# Patient Record
Sex: MALE | Race: WHITE | NOT HISPANIC OR LATINO | URBAN - METROPOLITAN AREA
[De-identification: names, ages, dates, MRNs, and addresses within clinical notes are randomized per-mention and may not be internally consistent; named-entity substitution may affect disease eponyms.]

---

## 2018-06-29 ENCOUNTER — OFFICE VISIT - HEALTHEAST (OUTPATIENT)
Dept: CARDIOLOGY | Facility: CLINIC | Age: 28
End: 2018-06-29

## 2018-06-29 DIAGNOSIS — I48.91 A-FIB (H): ICD-10-CM

## 2018-06-29 DIAGNOSIS — R00.2 PALPITATIONS: ICD-10-CM

## 2018-06-29 DIAGNOSIS — R07.9 ACUTE CHEST PAIN: ICD-10-CM

## 2018-06-29 ASSESSMENT — MIFFLIN-ST. JEOR: SCORE: 2252.66

## 2018-07-03 LAB
ATRIAL RATE - MUSE: 64 BPM
DIASTOLIC BLOOD PRESSURE - MUSE: NORMAL MMHG
INTERPRETATION ECG - MUSE: NORMAL
P AXIS - MUSE: 55 DEGREES
PR INTERVAL - MUSE: 158 MS
QRS DURATION - MUSE: 86 MS
QT - MUSE: 396 MS
QTC - MUSE: 408 MS
R AXIS - MUSE: 51 DEGREES
SYSTOLIC BLOOD PRESSURE - MUSE: NORMAL MMHG
T AXIS - MUSE: -2 DEGREES
VENTRICULAR RATE- MUSE: 64 BPM

## 2018-07-25 ENCOUNTER — HOSPITAL ENCOUNTER (OUTPATIENT)
Dept: CARDIOLOGY | Facility: HOSPITAL | Age: 28
Discharge: HOME | End: 2018-07-25
Attending: INTERNAL MEDICINE

## 2018-07-25 ENCOUNTER — COMMUNICATION - HEALTHEAST (OUTPATIENT)
Dept: CARDIOLOGY | Facility: CLINIC | Age: 28
End: 2018-07-25

## 2018-07-25 DIAGNOSIS — R00.2 PALPITATIONS: ICD-10-CM

## 2018-07-25 DIAGNOSIS — I48.91 A-FIB (H): ICD-10-CM

## 2018-07-25 DIAGNOSIS — R07.9 ACUTE CHEST PAIN: ICD-10-CM

## 2018-07-25 DIAGNOSIS — I47.10 SVT (SUPRAVENTRICULAR TACHYCARDIA) (H): ICD-10-CM

## 2018-07-25 LAB
AORTIC ROOT: 3.4 CM
BSA FOR ECHO PROCEDURE: 2.52 M2
CV ECHO HEIGHT: 75 IN
CV ECHO WEIGHT: 265 LBS
CV STRESS CURRENT BP HE: NORMAL
CV STRESS CURRENT HR HE: 100
CV STRESS CURRENT HR HE: 104
CV STRESS CURRENT HR HE: 105
CV STRESS CURRENT HR HE: 116
CV STRESS CURRENT HR HE: 119
CV STRESS CURRENT HR HE: 120
CV STRESS CURRENT HR HE: 123
CV STRESS CURRENT HR HE: 123
CV STRESS CURRENT HR HE: 130
CV STRESS CURRENT HR HE: 132
CV STRESS CURRENT HR HE: 134
CV STRESS CURRENT HR HE: 135
CV STRESS CURRENT HR HE: 140
CV STRESS CURRENT HR HE: 142
CV STRESS CURRENT HR HE: 152
CV STRESS CURRENT HR HE: 155
CV STRESS CURRENT HR HE: 160
CV STRESS CURRENT HR HE: 161
CV STRESS CURRENT HR HE: 165
CV STRESS CURRENT HR HE: 165
CV STRESS CURRENT HR HE: 166
CV STRESS CURRENT HR HE: 173
CV STRESS CURRENT HR HE: 174
CV STRESS CURRENT HR HE: 176
CV STRESS CURRENT HR HE: 181
CV STRESS CURRENT HR HE: 184
CV STRESS CURRENT HR HE: 184
CV STRESS CURRENT HR HE: 69
CV STRESS CURRENT HR HE: 86
CV STRESS CURRENT HR HE: 97
CV STRESS DEVIATION TIME HE: NORMAL
CV STRESS ECHO PERCENT HR HE: NORMAL
CV STRESS EXERCISE STAGE HE: NORMAL
CV STRESS EXERCISE STAGE REACHED HE: NORMAL
CV STRESS FINAL RESTING BP HE: NORMAL
CV STRESS FINAL RESTING HR HE: 119
CV STRESS MAX HR HE: 185
CV STRESS MAX TREADMILL GRADE HE: 16
CV STRESS MAX TREADMILL SPEED HE: 4.2
CV STRESS PEAK DIA BP HE: NORMAL
CV STRESS PEAK SYS BP HE: NORMAL
CV STRESS PHASE HE: NORMAL
CV STRESS PROTOCOL HE: NORMAL
CV STRESS RESTING PT POSITION HE: NORMAL
CV STRESS RESTING PT POSITION HE: NORMAL
CV STRESS ST DEVIATION AMOUNT HE: NORMAL
CV STRESS ST DEVIATION ELEVATION HE: NORMAL
CV STRESS ST EVELATION AMOUNT HE: NORMAL
CV STRESS TEST TYPE HE: NORMAL
CV STRESS TOTAL STAGE TIME MIN 1 HE: NORMAL
DOP CALC LVOT AREA: 4.52 CM2
DOP CALC LVOT DIAMETER: 2.4 CM
DOP CALC LVOT STROKE VOLUME: 85 CM3
DOP CALCLVOT PEAK VEL VTI: 18.8 CM
EJECTION FRACTION: 73 % (ref 55–75)
FRACTIONAL SHORTENING: 45.6 % (ref 28–44)
INTERVENTRICULAR SEPTUM IN END DIASTOLE: 0.7 CM (ref 0.6–1)
IVS/PW RATIO: 0.9
LA AREA 1: 15 CM2
LA AREA 2: 14.6 CM2
LEFT ATRIUM LENGTH: 4.52 CM
LEFT ATRIUM SIZE: 4.4 CM
LEFT ATRIUM TO AORTIC ROOT RATIO: 1.29 NO UNITS
LEFT ATRIUM VOLUME INDEX: 16.3 ML/M2
LEFT ATRIUM VOLUME: 41.2 ML
LEFT VENTRICLE CARDIAC INDEX: 1.7 L/MIN/M2
LEFT VENTRICLE CARDIAC OUTPUT: 4.2 L/MIN
LEFT VENTRICLE DIASTOLIC VOLUME INDEX: 53.2 CM3/M2 (ref 34–74)
LEFT VENTRICLE DIASTOLIC VOLUME: 134 CM3 (ref 62–150)
LEFT VENTRICLE HEART RATE: 49 BPM
LEFT VENTRICLE MASS INDEX: 62.3 G/M2
LEFT VENTRICLE SYSTOLIC VOLUME INDEX: 14.3 CM3/M2 (ref 11–31)
LEFT VENTRICLE SYSTOLIC VOLUME: 36 CM3 (ref 21–61)
LEFT VENTRICULAR INTERNAL DIMENSION IN DIASTOLE: 5.7 CM (ref 4.2–5.8)
LEFT VENTRICULAR INTERNAL DIMENSION IN SYSTOLE: 3.1 CM (ref 2.5–4)
LEFT VENTRICULAR MASS: 157.1 G
LEFT VENTRICULAR OUTFLOW TRACT MEAN GRADIENT: 2 MMHG
LEFT VENTRICULAR OUTFLOW TRACT MEAN VELOCITY: 60.2 CM/S
LEFT VENTRICULAR POSTERIOR WALL IN END DIASTOLE: 0.8 CM (ref 0.6–1)
LV STROKE VOLUME INDEX: 33.7 ML/M2
MITRAL VALVE DECELERATION SLOPE: 4460 MM/S2
MITRAL VALVE E/A RATIO: 2.2
MITRAL VALVE PRESSURE HALF-TIME: 67 MS
MV AVERAGE E/E' RATIO: 7 CM/S
MV DECELERATION TIME: 239 MS
MV E'TISSUE VEL-LAT: 11.7 CM/S
MV E'TISSUE VEL-MED: 11.2 CM/S
MV LATERAL E/E' RATIO: 6.8
MV MEDIAL E/E' RATIO: 7.1
MV PEAK A VELOCITY: 36 CM/S
MV PEAK E VELOCITY: 80 CM/S
MV VALVE AREA PRESSURE 1/2 METHOD: 3.3 CM2
NUC REST DIASTOLIC VOLUME INDEX: 4240 LBS
NUC REST SYSTOLIC VOLUME INDEX: 75 IN
PR MAX PG: 3 MMHG
PR PEAK VELOCITY: 85.5 CM/S
PR PEAK VELOCITY: 85.5 CM/S
STRESS ECHO BASELINE BP: NORMAL
STRESS ECHO BASELINE HR: 68
STRESS ECHO CALCULATED PERCENT HR: 95 %
STRESS ECHO LAST STRESS BP: NORMAL
STRESS ECHO LAST STRESS HR: 184
STRESS ECHO POST ESTIMATED WORKLOAD: 12.1
STRESS ECHO POST EXERCISE DUR MIN: 11
STRESS ECHO POST EXERCISE DUR SEC: 0
STRESS ECHO TARGET HR: 184.3
TRICUSPID REGURGITATION PEAK PRESSURE GRADIENT: 15.1 MMHG
TRICUSPID VALVE ANULAR PLANE SYSTOLIC EXCURSION: 1.9 CM
TRICUSPID VALVE PEAK REGURGITANT VELOCITY: 194 CM/S

## 2018-07-25 ASSESSMENT — MIFFLIN-ST. JEOR: SCORE: 2252.66

## 2018-07-26 ENCOUNTER — AMBULATORY - HEALTHEAST (OUTPATIENT)
Dept: CARDIOLOGY | Facility: CLINIC | Age: 28
End: 2018-07-26

## 2018-08-13 ENCOUNTER — AMBULATORY - HEALTHEAST (OUTPATIENT)
Dept: CARDIOLOGY | Facility: CLINIC | Age: 28
End: 2018-08-13

## 2018-08-13 DIAGNOSIS — I48.0 PAF (PAROXYSMAL ATRIAL FIBRILLATION) (H): ICD-10-CM

## 2018-08-30 ENCOUNTER — OFFICE VISIT - HEALTHEAST (OUTPATIENT)
Dept: CARDIOLOGY | Facility: CLINIC | Age: 28
End: 2018-08-30

## 2018-08-30 DIAGNOSIS — R00.2 PALPITATIONS: ICD-10-CM

## 2018-08-30 DIAGNOSIS — I48.0 PAROXYSMAL ATRIAL FIBRILLATION (H): ICD-10-CM

## 2018-08-30 ASSESSMENT — MIFFLIN-ST. JEOR: SCORE: 2263.55

## 2018-09-25 ENCOUNTER — OFFICE VISIT - HEALTHEAST (OUTPATIENT)
Dept: CARDIOLOGY | Facility: CLINIC | Age: 28
End: 2018-09-25

## 2018-09-25 DIAGNOSIS — R00.2 PALPITATIONS: ICD-10-CM

## 2018-09-25 DIAGNOSIS — I48.0 PAROXYSMAL ATRIAL FIBRILLATION (H): ICD-10-CM

## 2018-09-25 ASSESSMENT — MIFFLIN-ST. JEOR: SCORE: 2248.13

## 2018-10-02 ENCOUNTER — AMBULATORY - HEALTHEAST (OUTPATIENT)
Dept: CARDIOLOGY | Facility: CLINIC | Age: 28
End: 2018-10-02

## 2018-10-02 ENCOUNTER — SURGERY - HEALTHEAST (OUTPATIENT)
Dept: CARDIOLOGY | Facility: CLINIC | Age: 28
End: 2018-10-02

## 2018-10-02 DIAGNOSIS — I48.0 PAROXYSMAL ATRIAL FIBRILLATION (H): ICD-10-CM

## 2018-10-03 ENCOUNTER — AMBULATORY - HEALTHEAST (OUTPATIENT)
Dept: CARDIOLOGY | Facility: CLINIC | Age: 28
End: 2018-10-03

## 2018-10-03 ENCOUNTER — COMMUNICATION - HEALTHEAST (OUTPATIENT)
Dept: CARDIOLOGY | Facility: CLINIC | Age: 28
End: 2018-10-03

## 2018-10-29 ENCOUNTER — COMMUNICATION - HEALTHEAST (OUTPATIENT)
Dept: CARDIOLOGY | Facility: CLINIC | Age: 28
End: 2018-10-29

## 2018-10-29 DIAGNOSIS — I48.0 PAROXYSMAL A-FIB (H): ICD-10-CM

## 2018-11-13 ENCOUNTER — COMMUNICATION - HEALTHEAST (OUTPATIENT)
Dept: CARDIOLOGY | Facility: CLINIC | Age: 28
End: 2018-11-13

## 2018-12-05 ENCOUNTER — HOSPITAL ENCOUNTER (OUTPATIENT)
Dept: MRI IMAGING | Facility: HOSPITAL | Age: 28
Discharge: HOME | End: 2018-12-05
Attending: INTERNAL MEDICINE

## 2018-12-05 DIAGNOSIS — I48.0 PAROXYSMAL ATRIAL FIBRILLATION (H): ICD-10-CM

## 2018-12-07 ENCOUNTER — OFFICE VISIT - HEALTHEAST (OUTPATIENT)
Dept: CARDIOLOGY | Facility: CLINIC | Age: 28
End: 2018-12-07

## 2018-12-07 ENCOUNTER — AMBULATORY - HEALTHEAST (OUTPATIENT)
Dept: CARDIOLOGY | Facility: CLINIC | Age: 28
End: 2018-12-07

## 2018-12-07 ENCOUNTER — SURGERY - HEALTHEAST (OUTPATIENT)
Dept: CARDIOLOGY | Facility: CLINIC | Age: 28
End: 2018-12-07

## 2018-12-07 DIAGNOSIS — I48.0 PAROXYSMAL ATRIAL FIBRILLATION (H): ICD-10-CM

## 2018-12-07 LAB
ANION GAP SERPL CALCULATED.3IONS-SCNC: 9 MMOL/L (ref 5–18)
ATRIAL RATE - MUSE: 77 BPM
BUN SERPL-MCNC: 10 MG/DL (ref 8–22)
CALCIUM SERPL-MCNC: 10.3 MG/DL (ref 8.5–10.5)
CHLORIDE BLD-SCNC: 105 MMOL/L (ref 98–107)
CO2 SERPL-SCNC: 25 MMOL/L (ref 22–31)
CREAT SERPL-MCNC: 0.9 MG/DL (ref 0.7–1.3)
DIASTOLIC BLOOD PRESSURE - MUSE: NORMAL MMHG
ERYTHROCYTE [DISTWIDTH] IN BLOOD BY AUTOMATED COUNT: 13.1 % (ref 11–14.5)
GFR SERPL CREATININE-BSD FRML MDRD: >60 ML/MIN/1.73M2
GLUCOSE BLD-MCNC: 96 MG/DL (ref 70–125)
HCT VFR BLD AUTO: 47.3 % (ref 40–54)
HGB BLD-MCNC: 16.2 G/DL (ref 14–18)
INTERPRETATION ECG - MUSE: NORMAL
MCH RBC QN AUTO: 31 PG (ref 27–34)
MCHC RBC AUTO-ENTMCNC: 34.2 G/DL (ref 32–36)
MCV RBC AUTO: 90 FL (ref 80–100)
MR CARDIAC LEFT VENTRIAL CARDIAC INDEX: 3.3 L/MIN/M2 (ref 1.7–4.2)
MR CARDIAC LEFT VENTRICAL CARDIAC OUTPUT: 8.26 L/MIN (ref 2.8–8.8)
MR CARDIAC LEFT VENTRICULAR DIASTOLIC VOLUME INDEX: 93.26 ML/M2 (ref 47–92)
MR CARDIAC LEFT VENTRICULAR MASS INDEX: 61.51 G/M2 (ref 70–113)
MR CARDIAC LEFT VENTRICULAR MASS: 155 G (ref 118–238)
MR CARDIAC LEFT VENTRICULAR STROKE VOLUME INDEX: 56.67 ML/M2 (ref 32–62)
MR CARDIAC LEFT VENTRICULAR SYSTOLIC VOLUME INDEX: 36.59 ML/M2 (ref 13–30)
MR EJECTION FRACTION: 60.77 %
MR HEIGHT: 1.91 M
MR LEFT VENTRICULAR DYSTOLIC VOLUMEN: 235 ML (ref 77–195)
MR LEFT VENTRICULAR STROKE VOLUMEN: 142.8 ML (ref 51–133)
MR LEFT VENTRICULAR SYSTOLIC VOLUME: 92.2 ML (ref 19–72)
MR WEIGHT: 125.6 KG
P AXIS - MUSE: 42 DEGREES
PLATELET # BLD AUTO: 240 THOU/UL (ref 140–440)
PMV BLD AUTO: 10.7 FL (ref 8.5–12.5)
POTASSIUM BLD-SCNC: 4.4 MMOL/L (ref 3.5–5)
PR INTERVAL - MUSE: 166 MS
QRS DURATION - MUSE: 78 MS
QT - MUSE: 358 MS
QTC - MUSE: 405 MS
R AXIS - MUSE: 40 DEGREES
RBC # BLD AUTO: 5.23 MILL/UL (ref 4.4–6.2)
SODIUM SERPL-SCNC: 139 MMOL/L (ref 136–145)
SYSTOLIC BLOOD PRESSURE - MUSE: NORMAL MMHG
T AXIS - MUSE: -7 DEGREES
VENTRICULAR RATE- MUSE: 77 BPM
WBC: 5.6 THOU/UL (ref 4–11)

## 2018-12-07 ASSESSMENT — MIFFLIN-ST. JEOR: SCORE: 2307.09

## 2018-12-08 ENCOUNTER — ANESTHESIA - HEALTHEAST (OUTPATIENT)
Dept: CARDIOLOGY | Facility: CLINIC | Age: 28
End: 2018-12-08

## 2018-12-10 ENCOUNTER — SURGERY - HEALTHEAST (OUTPATIENT)
Dept: CARDIOLOGY | Facility: CLINIC | Age: 28
End: 2018-12-10

## 2018-12-10 ASSESSMENT — MIFFLIN-ST. JEOR: SCORE: 2335.22

## 2018-12-13 ENCOUNTER — AMBULATORY - HEALTHEAST (OUTPATIENT)
Dept: CARDIOLOGY | Facility: CLINIC | Age: 28
End: 2018-12-13

## 2018-12-13 DIAGNOSIS — I48.0 PAROXYSMAL ATRIAL FIBRILLATION (H): ICD-10-CM

## 2018-12-13 ASSESSMENT — MIFFLIN-ST. JEOR: SCORE: 2311.63

## 2019-01-21 ENCOUNTER — OFFICE VISIT - HEALTHEAST (OUTPATIENT)
Dept: CARDIOLOGY | Facility: CLINIC | Age: 29
End: 2019-01-21

## 2019-01-21 DIAGNOSIS — I48.0 PAROXYSMAL ATRIAL FIBRILLATION (H): ICD-10-CM

## 2019-01-21 DIAGNOSIS — I47.10 PSVT (PAROXYSMAL SUPRAVENTRICULAR TACHYCARDIA) (H): ICD-10-CM

## 2019-01-21 DIAGNOSIS — Z98.890 STATUS POST CATHETER ABLATION OF ATRIAL FIBRILLATION: ICD-10-CM

## 2019-01-21 ASSESSMENT — MIFFLIN-ST. JEOR: SCORE: 2302.56

## 2019-03-11 ENCOUNTER — COMMUNICATION - HEALTHEAST (OUTPATIENT)
Dept: CARDIOLOGY | Facility: CLINIC | Age: 29
End: 2019-03-11

## 2019-03-26 ENCOUNTER — OFFICE VISIT (OUTPATIENT)
Dept: URGENT CARE | Facility: URGENT CARE | Age: 29
End: 2019-03-26

## 2019-03-26 VITALS
RESPIRATION RATE: 16 BRPM | TEMPERATURE: 99.8 F | OXYGEN SATURATION: 96 % | HEART RATE: 111 BPM | SYSTOLIC BLOOD PRESSURE: 120 MMHG | WEIGHT: 268.5 LBS | DIASTOLIC BLOOD PRESSURE: 86 MMHG

## 2019-03-26 DIAGNOSIS — R11.2 NON-INTRACTABLE VOMITING WITH NAUSEA, UNSPECIFIED VOMITING TYPE: Primary | ICD-10-CM

## 2019-03-26 DIAGNOSIS — A08.4 VIRAL GASTROENTERITIS: ICD-10-CM

## 2019-03-26 PROCEDURE — 99203 OFFICE O/P NEW LOW 30 MIN: CPT | Performed by: NURSE PRACTITIONER

## 2019-03-26 RX ORDER — ONDANSETRON 4 MG/1
4 TABLET, FILM COATED ORAL EVERY 8 HOURS PRN
Qty: 15 TABLET | Refills: 0 | Status: SHIPPED | OUTPATIENT
Start: 2019-03-26 | End: 2019-03-31

## 2019-03-26 ASSESSMENT — ENCOUNTER SYMPTOMS
RHINORRHEA: 0
CHILLS: 0
DIARRHEA: 1
DIAPHORESIS: 0
SHORTNESS OF BREATH: 0
SORE THROAT: 0
NAUSEA: 1
COUGH: 0
VOMITING: 1
FEVER: 0

## 2019-03-26 ASSESSMENT — PAIN SCALES - GENERAL: PAINLEVEL: MODERATE PAIN (4)

## 2019-03-26 NOTE — PROGRESS NOTES
SUBJECTIVE:   Mike Padilla is a 28 year old male presenting with a chief complaint of   Chief Complaint   Patient presents with     Vomiting     patient stated couldn't keep things down for all morning        He is a new patient of [facility].    nasusea vomit and diarrhea    Onset of symptoms was 7 hour(s) ago.  Course of illness is worsening.    Severity moderate  Current and Associated symptoms: nausea, vomiting and diarrhea  Treatment measures tried include None tried.  Predisposing factors include None.        Review of Systems   Constitutional: Negative for chills, diaphoresis and fever.   HENT: Negative for congestion, ear pain, rhinorrhea and sore throat.    Respiratory: Negative for cough and shortness of breath.    Gastrointestinal: Positive for diarrhea, nausea and vomiting.   All other systems reviewed and are negative.      No past medical history on file.  History reviewed. No pertinent family history.  Current Outpatient Medications   Medication Sig Dispense Refill     apixaban ANTICOAGULANT (ELIQUIS) 5 MG tablet Take 5 mg by mouth       ondansetron (ZOFRAN) 4 MG tablet Take 1 tablet (4 mg) by mouth every 8 hours as needed for nausea 15 tablet 0     Social History     Tobacco Use     Smoking status: Never Smoker     Smokeless tobacco: Never Used   Substance Use Topics     Alcohol use: Yes       OBJECTIVE  /86 (BP Location: Left arm, Patient Position: Sitting, Cuff Size: Adult Large)   Pulse 111   Temp 99.8  F (37.7  C) (Oral)   Resp 16   Wt 121.8 kg (268 lb 8 oz)   SpO2 96%     Physical Exam   Constitutional: No distress.   HENT:   Head: Normocephalic and atraumatic.   Right Ear: Tympanic membrane and external ear normal.   Left Ear: Tympanic membrane and external ear normal.   Mouth/Throat: Oropharynx is clear and moist.   Eyes: EOM are normal. Pupils are equal, round, and reactive to light.   Neck: Normal range of motion. Neck supple.   Cardiovascular: Normal rate and normal heart  "sounds.   Pulmonary/Chest: Effort normal and breath sounds normal. No respiratory distress.   Abdominal: Soft. Bowel sounds are normal. There is tenderness (diffuse).   Lymphadenopathy:     He has no cervical adenopathy.   Neurological: He is alert. No cranial nerve deficit.   Skin: Skin is warm and dry. He is not diaphoretic.   Psychiatric: He has a normal mood and affect.   Nursing note and vitals reviewed.        ASSESSMENT:      ICD-10-CM    1. Non-intractable vomiting with nausea, unspecified vomiting type R11.2 ondansetron (ZOFRAN) 4 MG tablet   2. Viral gastroenteritis A08.4           Differential Diagnosis:  Gastro: viral gastroenteritis, food poisoning and intestinal parasites    Serious Comorbid Conditions:  Adult:  None    PLAN:  hydration encouraged  The 'BRAT' diet is suggested, then progress to diet as tolerated as symptoms lake. To ER if bloody stools, persistent diarrhea,  fever or abdominal pain.                  Patient Instructions     Patient Education     Vomiting (Adult)  Vomiting is a common symptom that may be due to different causes. These include gastroenteritis (\"stomach flu\"), food poisoning and gastritis. There are other more serious causes of vomiting which may be hard to diagnose early in the illness. Therefore, it is important to watch for the warning signs listed below.  The main danger from repeated vomiting is dehydration. This is due to excess loss of water and minerals from the body. When this occurs, your body fluids must be replaced.  Home care    If symptoms are severe, rest at home for the next 24 hours.    Because your symptoms may be from an infection, wash your hands often and well. If soap and water are not available, use alcohol-based  to keep from spreading the infection to others.    Wash your hands for at least 20 seconds. Humming the happy birthday song twice while you wash is an easy way to make sure you've washed for 20 seconds.    Wash your hands after " using the toilet, before and after preparing food, before eating food, after changing a diaper, cleaning a wound, caring for a sick person, and blowing your nose, coughing, or sneezing. You should also wash your hands after caring for someone who is sick, touching pet food, or treats, and touching an animal, or animal waste.    You may use acetaminophen or NSAID medicines like ibuprofen or naproxen to control fever, unless another medicine was prescribed. If you have chronic liver or kidney disease or ever had a stomach ulcer or gastrointestinal bleeding, talk with your doctor before using these medicines. Aspirin should never be used in anyone under 18 years of age who is ill with a fever. It may cause severe liver damage. Don't use NSAID medicines if you are already taking one for another condition (like arthritis) or are on aspirin (such as for heart disease, or after a stroke)    Don't use tobacco and or drink alcohol, which may worsen your symptoms.    If medicines for vomiting were prescribed, take as directed.    Once vomiting stops, then follow these guidelines:  During the first 12 to 24 hours follow the diet below:    Fruit juices. Apple, grape juice, clear fruit drinks, and electrolyte replacement drinks.    Beverages. Soft drinks without caffeine; mineral water (plain or flavored), decaffeinated tea and coffee.    Soups. Clear broth and bouillon    Desserts. Plain gelatin, ice pops, and fruit juice bars. As you feel better, you may add 6 to 8 ounces of yogurt per day.  During the next 24 hours you may add the following to the above:    Hot cereal, plain toast, bread, rolls, crackers    Plain noodles, rice, mashed potatoes, chicken noodle or rice soup    Unsweetened canned fruit such as applesauce, bananas (avoid pineapple and citrus)    Limit caffeine and chocolate. No spices or seasonings except salt.  During the next 24 hours:  Gradually resume a normal diet, as you feel better and your symptoms  "lessen.  Follow-up care  Follow up with your healthcare provider, or as advised.  When to seek medical advice  Call your healthcare provider right away if any of these occur:    Constant right-sided lower belly pain or increasing general belly pain    Continued vomiting (unable to keep liquids down) for 24 hours    Vomiting blood or coffee grounds    Swollen belly    Frequent diarrhea (more than 5 times a day); blood (red or black color) or mucus in diarrhea    Reduced urine output or extreme thirst    Weakness, dizziness or fainting    Unusually drowsy or confused    Fever of 100.4 F (38 C) oral or higher, or as directed    Yellow color of the eyes or skin  Date Last Reviewed: 3/1/2018    8920-8770 [employer]. [address] [place] [address], [place] [address]. All rights reserved. This information is not intended as a substitute for professional medical care. Always follow your healthcare professional's instructions.           Patient Education     Vomiting (Adult)  Vomiting is a common symptom that may be due to different causes. These include gastroenteritis (\"stomach flu\"), food poisoning and gastritis. There are other more serious causes of vomiting which may be hard to diagnose early in the illness. Therefore, it is important to watch for the warning signs listed below.  The main danger from repeated vomiting is dehydration. This is due to excess loss of water and minerals from the body. When this occurs, your body fluids must be replaced.  Home care    If symptoms are severe, rest at home for the next 24 hours.    Because your symptoms may be from an infection, wash your hands often and well. If soap and water are not available, use alcohol-based  to keep from spreading the infection to others.    Wash your hands for at least 20 seconds. Humming the happy birthday song twice while you wash is an easy way to make sure you've washed for 20 seconds.    Wash your hands after using the toilet, " before and after preparing food, before eating food, after changing a diaper, cleaning a wound, caring for a sick person, and blowing your nose, coughing, or sneezing. You should also wash your hands after caring for someone who is sick, touching pet food, or treats, and touching an animal, or animal waste.    You may use acetaminophen or NSAID medicines like ibuprofen or naproxen to control fever, unless another medicine was prescribed. If you have chronic liver or kidney disease or ever had a stomach ulcer or gastrointestinal bleeding, talk with your doctor before using these medicines. Aspirin should never be used in anyone under 18 years of age who is ill with a fever. It may cause severe liver damage. Don't use NSAID medicines if you are already taking one for another condition (like arthritis) or are on aspirin (such as for heart disease, or after a stroke)    Don't use tobacco and or drink alcohol, which may worsen your symptoms.    If medicines for vomiting were prescribed, take as directed.    Once vomiting stops, then follow these guidelines:  During the first 12 to 24 hours follow the diet below:    Fruit juices. Apple, grape juice, clear fruit drinks, and electrolyte replacement drinks.    Beverages. Soft drinks without caffeine; mineral water (plain or flavored), decaffeinated tea and coffee.    Soups. Clear broth and bouillon    Desserts. Plain gelatin, ice pops, and fruit juice bars. As you feel better, you may add 6 to 8 ounces of yogurt per day.  During the next 24 hours you may add the following to the above:    Hot cereal, plain toast, bread, rolls, crackers    Plain noodles, rice, mashed potatoes, chicken noodle or rice soup    Unsweetened canned fruit such as applesauce, bananas (avoid pineapple and citrus)    Limit caffeine and chocolate. No spices or seasonings except salt.  During the next 24 hours:  Gradually resume a normal diet, as you feel better and your symptoms lessen.  Follow-up  care  Follow up with your healthcare provider, or as advised.  When to seek medical advice  Call your healthcare provider right away if any of these occur:    Constant right-sided lower belly pain or increasing general belly pain    Continued vomiting (unable to keep liquids down) for 24 hours    Vomiting blood or coffee grounds    Swollen belly    Frequent diarrhea (more than 5 times a day); blood (red or black color) or mucus in diarrhea    Reduced urine output or extreme thirst    Weakness, dizziness or fainting    Unusually drowsy or confused    Fever of 100.4 F (38 C) oral or higher, or as directed    Yellow color of the eyes or skin  Date Last Reviewed: 3/1/2018    6946-7067 [employer]. [address] [place] [address], [place] [address]. All rights reserved. This information is not intended as a substitute for professional medical care. Always follow your healthcare professional's instructions.

## 2019-03-26 NOTE — PATIENT INSTRUCTIONS
"  Patient Education     Vomiting (Adult)  Vomiting is a common symptom that may be due to different causes. These include gastroenteritis (\"stomach flu\"), food poisoning and gastritis. There are other more serious causes of vomiting which may be hard to diagnose early in the illness. Therefore, it is important to watch for the warning signs listed below.  The main danger from repeated vomiting is dehydration. This is due to excess loss of water and minerals from the body. When this occurs, your body fluids must be replaced.  Home care    If symptoms are severe, rest at home for the next 24 hours.    Because your symptoms may be from an infection, wash your hands often and well. If soap and water are not available, use alcohol-based  to keep from spreading the infection to others.    Wash your hands for at least 20 seconds. Humming the happy birthday song twice while you wash is an easy way to make sure you've washed for 20 seconds.    Wash your hands after using the toilet, before and after preparing food, before eating food, after changing a diaper, cleaning a wound, caring for a sick person, and blowing your nose, coughing, or sneezing. You should also wash your hands after caring for someone who is sick, touching pet food, or treats, and touching an animal, or animal waste.    You may use acetaminophen or NSAID medicines like ibuprofen or naproxen to control fever, unless another medicine was prescribed. If you have chronic liver or kidney disease or ever had a stomach ulcer or gastrointestinal bleeding, talk with your doctor before using these medicines. Aspirin should never be used in anyone under 18 years of age who is ill with a fever. It may cause severe liver damage. Don't use NSAID medicines if you are already taking one for another condition (like arthritis) or are on aspirin (such as for heart disease, or after a stroke)    Don't use tobacco and or drink alcohol, which may worsen your " symptoms.    If medicines for vomiting were prescribed, take as directed.    Once vomiting stops, then follow these guidelines:  During the first 12 to 24 hours follow the diet below:    Fruit juices. Apple, grape juice, clear fruit drinks, and electrolyte replacement drinks.    Beverages. Soft drinks without caffeine; mineral water (plain or flavored), decaffeinated tea and coffee.    Soups. Clear broth and bouillon    Desserts. Plain gelatin, ice pops, and fruit juice bars. As you feel better, you may add 6 to 8 ounces of yogurt per day.  During the next 24 hours you may add the following to the above:    Hot cereal, plain toast, bread, rolls, crackers    Plain noodles, rice, mashed potatoes, chicken noodle or rice soup    Unsweetened canned fruit such as applesauce, bananas (avoid pineapple and citrus)    Limit caffeine and chocolate. No spices or seasonings except salt.  During the next 24 hours:  Gradually resume a normal diet, as you feel better and your symptoms lessen.  Follow-up care  Follow up with your healthcare provider, or as advised.  When to seek medical advice  Call your healthcare provider right away if any of these occur:    Constant right-sided lower belly pain or increasing general belly pain    Continued vomiting (unable to keep liquids down) for 24 hours    Vomiting blood or coffee grounds    Swollen belly    Frequent diarrhea (more than 5 times a day); blood (red or black color) or mucus in diarrhea    Reduced urine output or extreme thirst    Weakness, dizziness or fainting    Unusually drowsy or confused    Fever of 100.4 F (38 C) oral or higher, or as directed    Yellow color of the eyes or skin  Date Last Reviewed: 3/1/2018    1079-5730 [employer]. [address] [place] [address], [place] [address]. All rights reserved. This information is not intended as a substitute for professional medical care. Always follow your healthcare professional's instructions.           Patient  "Education     Vomiting (Adult)  Vomiting is a common symptom that may be due to different causes. These include gastroenteritis (\"stomach flu\"), food poisoning and gastritis. There are other more serious causes of vomiting which may be hard to diagnose early in the illness. Therefore, it is important to watch for the warning signs listed below.  The main danger from repeated vomiting is dehydration. This is due to excess loss of water and minerals from the body. When this occurs, your body fluids must be replaced.  Home care    If symptoms are severe, rest at home for the next 24 hours.    Because your symptoms may be from an infection, wash your hands often and well. If soap and water are not available, use alcohol-based  to keep from spreading the infection to others.    Wash your hands for at least 20 seconds. Humming the happy birthday song twice while you wash is an easy way to make sure you've washed for 20 seconds.    Wash your hands after using the toilet, before and after preparing food, before eating food, after changing a diaper, cleaning a wound, caring for a sick person, and blowing your nose, coughing, or sneezing. You should also wash your hands after caring for someone who is sick, touching pet food, or treats, and touching an animal, or animal waste.    You may use acetaminophen or NSAID medicines like ibuprofen or naproxen to control fever, unless another medicine was prescribed. If you have chronic liver or kidney disease or ever had a stomach ulcer or gastrointestinal bleeding, talk with your doctor before using these medicines. Aspirin should never be used in anyone under 18 years of age who is ill with a fever. It may cause severe liver damage. Don't use NSAID medicines if you are already taking one for another condition (like arthritis) or are on aspirin (such as for heart disease, or after a stroke)    Don't use tobacco and or drink alcohol, which may worsen your symptoms.    If " medicines for vomiting were prescribed, take as directed.    Once vomiting stops, then follow these guidelines:  During the first 12 to 24 hours follow the diet below:    Fruit juices. Apple, grape juice, clear fruit drinks, and electrolyte replacement drinks.    Beverages. Soft drinks without caffeine; mineral water (plain or flavored), decaffeinated tea and coffee.    Soups. Clear broth and bouillon    Desserts. Plain gelatin, ice pops, and fruit juice bars. As you feel better, you may add 6 to 8 ounces of yogurt per day.  During the next 24 hours you may add the following to the above:    Hot cereal, plain toast, bread, rolls, crackers    Plain noodles, rice, mashed potatoes, chicken noodle or rice soup    Unsweetened canned fruit such as applesauce, bananas (avoid pineapple and citrus)    Limit caffeine and chocolate. No spices or seasonings except salt.  During the next 24 hours:  Gradually resume a normal diet, as you feel better and your symptoms lessen.  Follow-up care  Follow up with your healthcare provider, or as advised.  When to seek medical advice  Call your healthcare provider right away if any of these occur:    Constant right-sided lower belly pain or increasing general belly pain    Continued vomiting (unable to keep liquids down) for 24 hours    Vomiting blood or coffee grounds    Swollen belly    Frequent diarrhea (more than 5 times a day); blood (red or black color) or mucus in diarrhea    Reduced urine output or extreme thirst    Weakness, dizziness or fainting    Unusually drowsy or confused    Fever of 100.4 F (38 C) oral or higher, or as directed    Yellow color of the eyes or skin  Date Last Reviewed: 3/1/2018    4695-3477 [employer]. [address] [place] [address], [place] [address]. All rights reserved. This information is not intended as a substitute for professional medical care. Always follow your healthcare professional's instructions.

## 2019-04-29 ENCOUNTER — COMMUNICATION - HEALTHEAST (OUTPATIENT)
Dept: CARDIOLOGY | Facility: CLINIC | Age: 29
End: 2019-04-29

## 2019-05-20 ENCOUNTER — AMBULATORY - HEALTHEAST (OUTPATIENT)
Dept: CARDIOLOGY | Facility: CLINIC | Age: 29
End: 2019-05-20

## 2019-05-20 ENCOUNTER — COMMUNICATION - HEALTHEAST (OUTPATIENT)
Dept: CARDIOLOGY | Facility: CLINIC | Age: 29
End: 2019-05-20

## 2019-05-20 DIAGNOSIS — I48.0 PAROXYSMAL ATRIAL FIBRILLATION (H): ICD-10-CM

## 2019-05-20 DIAGNOSIS — I48.0 PAROXYSMAL A-FIB (H): ICD-10-CM

## 2019-05-28 ENCOUNTER — HOSPITAL ENCOUNTER (OUTPATIENT)
Dept: CARDIOLOGY | Facility: HOSPITAL | Age: 29
Discharge: HOME | End: 2019-05-28
Attending: NURSE PRACTITIONER

## 2019-05-28 DIAGNOSIS — I48.0 PAROXYSMAL ATRIAL FIBRILLATION (H): ICD-10-CM

## 2019-07-09 ENCOUNTER — OFFICE VISIT - HEALTHEAST (OUTPATIENT)
Dept: CARDIOLOGY | Facility: CLINIC | Age: 29
End: 2019-07-09

## 2019-07-09 DIAGNOSIS — I48.0 PAROXYSMAL ATRIAL FIBRILLATION (H): ICD-10-CM

## 2019-07-09 DIAGNOSIS — Z98.890 STATUS POST CATHETER ABLATION OF ATRIAL FIBRILLATION: ICD-10-CM

## 2019-07-09 DIAGNOSIS — I47.10 PSVT (PAROXYSMAL SUPRAVENTRICULAR TACHYCARDIA) (H): ICD-10-CM

## 2019-07-09 ASSESSMENT — MIFFLIN-ST. JEOR: SCORE: 2239.05

## 2021-05-28 NOTE — TELEPHONE ENCOUNTER
Pt calling wanting to know if he can go off Eliqius has no insurance  Reviewed with Adela pt needs to be seen first, pt has been a no show after procedure  Pt was called with the above and  will set appt.

## 2021-05-28 NOTE — TELEPHONE ENCOUNTER
Return call to patient to verify his insurance status and to see if he is able to schedule his follow up.  He states that yes he is able, transferred call to scheduling.  Pt hung up before call could get to the .  Message sent to our  for apt with Adela Bajwa a few weeks after 14 day heart monitor.

## 2021-05-30 NOTE — PATIENT INSTRUCTIONS - HE
Mike Padilla,    It was a pleasure to see you today at the [facility].     My recommendations after this visit include:    Stop taking Eliquis.   Start aspirin 81 mg daily.    Keep a log of A fib episodes.  Call if they increase in frequency or duration.    Follow up with Dr. Knox in December    Adela Bajwa, [facility], Electrophysiology  [phone]  EP nurses [phone]

## 2021-06-01 VITALS — BODY MASS INDEX: 32.95 KG/M2 | WEIGHT: 265 LBS | HEIGHT: 75 IN

## 2021-06-01 VITALS — WEIGHT: 265 LBS | BODY MASS INDEX: 32.95 KG/M2 | HEIGHT: 75 IN

## 2021-06-02 ENCOUNTER — RECORDS - HEALTHEAST (OUTPATIENT)
Dept: ADMINISTRATIVE | Facility: CLINIC | Age: 31
End: 2021-06-02

## 2021-06-02 VITALS — WEIGHT: 278 LBS | BODY MASS INDEX: 34.57 KG/M2 | HEIGHT: 75 IN

## 2021-06-02 VITALS — BODY MASS INDEX: 34.23 KG/M2 | WEIGHT: 275.3 LBS | HEIGHT: 75 IN

## 2021-06-02 VITALS — WEIGHT: 276 LBS | HEIGHT: 75 IN | BODY MASS INDEX: 34.32 KG/M2

## 2021-06-02 VITALS — HEIGHT: 75 IN | WEIGHT: 277 LBS | BODY MASS INDEX: 34.44 KG/M2

## 2021-06-02 VITALS — HEIGHT: 75 IN | BODY MASS INDEX: 32.83 KG/M2 | WEIGHT: 264 LBS

## 2021-06-02 VITALS — HEIGHT: 75 IN | WEIGHT: 267.4 LBS | BODY MASS INDEX: 33.25 KG/M2

## 2021-06-03 VITALS — BODY MASS INDEX: 32.58 KG/M2 | WEIGHT: 262 LBS | HEIGHT: 75 IN

## 2021-06-16 PROBLEM — I48.0 PAROXYSMAL ATRIAL FIBRILLATION (H): Status: ACTIVE | Noted: 2018-08-30

## 2021-06-16 PROBLEM — Z98.890 STATUS POST CATHETER ABLATION OF ATRIAL FIBRILLATION: Status: ACTIVE | Noted: 2018-12-10

## 2021-06-16 PROBLEM — I47.10 PSVT (PAROXYSMAL SUPRAVENTRICULAR TACHYCARDIA) (H): Status: ACTIVE | Noted: 2018-12-10

## 2021-06-20 NOTE — PROGRESS NOTES
1990  Home:[phone] (home) Cell:[phone] (mobile)  Emergency Contact: *No Contact Specified*     +++Important patient information for CSC/Cath Lab staff : None+++    [facility] EP Cath Lab Procedure Order   Ablation Type:  PVI- Atrial Fibrillation  Specific location of pathway: Left     Diagnosis:  AF/SVT  Anticipated Case Duration:  4-Two PVIs in a day   Scheduling Needs/Timeframe:  Next AvailableWill need to start anticoagulant 4 wks prior- schedule accordingly-Pt would like December    MD Preference: Dr Abilio YU Assist:  No Specific MD:  N/A    Current Device: None None  Device Company/Device Rep Needed for Procedure: None    Pre-Procedural Testing needed: Pulmonary Vein CT/MRI  Mapping System Required:  Carto  ICE Needed:  No  Special equipment/Staff needed for case: None  Anesthesia:  General-Whole Case  Research Protocol:  No    [facility] EP Cath Lab Prep   Ordering Provider: Dr Knox  Ordering Date: 10/2/2018  Orders Status: Intial order placed and Order set placed  EP NC Contact: Akosua Britt RN    H&P:  Schedule apt with EP NP to complete within 1 wk of scheduled PVI  PCP: No Primary Care Provider, None    Pre-op Labs: Done within 7 days    Medical Records Pertinent for Procedure:  Stress test 7-25-18, Holter Patch monitor  7-25-18, Echo 7-25-18 EF 55-60% and EKG 6-29-18 SR @64    Patient Education:    Teach with Patient: Teach with pt will be completed same day as pre-op H&P-see additional clinic note    Risks Reviewed:     Pulmonary Vein/AF/Radiofrequency Ablation  In addition to standard risks for Radiofrequency Ablation, there is:    <2% for significant pulmonary vein stenosis    <2% risk for embolic events    <1% risk for esophageal fistula    <1% risk death    Pulmonary Vein Isolation / Cryoablation Risks:    1-2% risk for phrenic nerve paralysis    <1% risk for pulmonary vein stenosis    Risk of esophageal irritation with no incidence of atrial-esophageal fistula    Rare cryoballoon  rupture    <1% risk death         Cardiac Catheter Ablation    <1% risk for the following: hypotension, hemorrhage, vascular injury including perforation of vein, artery or heart, thrombophlebitis, systemic or pulmonic emboli; cardiac perforation, (tamponade), infection, pneumothorax, arrhythmias, proarrhythmic effects of drugs, radiation exposure.    1-2% complete heart block (for AVNRT or septol accessory pathway).    <0.5% CVA or MI    <0.1% death    If external defibrillation is needed, 75% risk for superficial burn.    1-2% tamponade and aortic puncture with left sided transeptal approach for left side NORMA - increase risk of CVA to <2%.    Late arrhythmia recurrences depends upon the primary rhythm disturbance.      Consent: Will be obtained in CSC day of procedure    Pre-Procedure Instructions that were Reviewed with Patient:  NPO after midnight, Remove all jewelry prior to coming in for procedure, Shower prior to arrival, Notified patient of time and date of procedure by CV , Transportation arrangements needed s/p procedure, Post-procedure follow up process, Sedation plan/orders and Pre-procedure letter was sent to pt by CV     Pre-Procedure Medication Instructions:  Instructions given to pt regarding anticoagulants: Pt is not on an anticoagulant- instructed to continue anticoagulation uninterrupted through their procedure-will need to start 4 weeks prior  Instructions given to pt regarding antiarrhythmic medication: Flecainide; Pt instructed to hold 3days prior to procedure  Instructions for medication, other than anticoagulants/antiarrhythmics listed above, given to pt: to hold all morning medications with the exception of anticoagulation.    Allergies   Allergen Reactions     Penicillins Unknown       Current Outpatient Prescriptions:      flecainide (TAMBOCOR) 100 MG tablet, Take 1 tablet (100 mg total) by mouth as needed (Take flecainide 100 mg at onset of atrial fibrillation-can be  repeated in 4 hours)., Disp: 30 tablet, Rfl: 5     metoprolol succinate (TOPROL XL) 50 MG 24 hr tablet, Take 0.5 tablets (25 mg total) by mouth daily., Disp: 90 tablet, Rfl: 5    Documentation Date:10/2/2018 2:58 PM  Akousa Britt RN

## 2021-06-20 NOTE — PROGRESS NOTES
[facility] Heart Care Note    Assessment:  2 year history of episodic tachycardia  Patient activated monitor shows episodes of paroxysmal atrial fibrillation with elevated ventricular response-symptomatic  Some regular tachycardia is also present that may represent supraventricular tachycardia  No history of structural heart disease  Normal exam  Normal electrocardiogram  Normal echocardiogram  Do not feel he is a candidate for anticoagulation      Plan:    Your monitor showed that she have episodes of atrial fibrillation; paroxysmal atrial fibrillation with elevated ventricular response  other episodes are moreregular and could represent supraventricular tachycardia  There is no evidence for structural heart disease, your exam has been normal, echocardiogram has been normal and your stress test is normal  We discussed management of paroxysmal atrial fibrillation  Could try empiric medical treatment and have prescribed Toprol 50 mg daily  At onset of atrial fibrillation take flecainide 100 mg- in 4 hours take another Flecainide 100mg and toprol 50 mg  We discussed anticoagulation but I do not feel you are a candidate for anticoagulation because of your low risk score  I think you are an excellent candidate for comprehensive ablation.  I would expect the diagnostic study to look for SVT mechanism and if anomalous pathway is present ablation could be done  He should also have an A. fib ablation, pulmonary vein isolation  Follow-up in atrial fibrillation with the nurse practitioner to prepare you for the ablation procedure      Subjective:    I had the opportunity to see.Mike Padilla , who is a 28 y.o. male with a known history of paroxysmal tachycardia  He has about a 2 year history of episodic tachycardia.    He has been having about 2 episodes a week.  A sudden onset of fast heart rhythm is noted the last 10-30 minutes last  Last year he saw Dr. Schaefer at [facility] and studies showed quite a bit of  regular tachycardia felt to be SVT he also was noted to have atrial fibrillation and there was some concern that SVT would need to the atrial fibrillation    He was recommended for ablation at that time  He continues to have episodes of tachycardia and recently saw Dr. Walker    He underwent a patient activated monitor of multiple episodes of paroxysmal atrial fibrillation with rates up to 180 bpm  At times there was a rather regular tachycardia about 150 bpm.  He also had some PACs     He is never had a persistent episode of atrial fibrillation but sometimes they can last for 30 minutes.  He is moderately distressed during the tachycardia feeling lightheaded weak even chest discomfort  He has he has plain basketball, he will have to stop his activity  He has no history of structural heart disease denies rheumatic fever heart murmurs  History of blood clots thromboembolism TIA  History of anticoagulation  History of internal bleeding or hemorrhage  Been prescribed various medications but is never taken any medications.  One time he was prescribed pindolol, later flecainide    Discussed mechanisms of atrial fibrillation and how they can happen in patients with no structural heart disease  We discussed anticoagulation but his risk panel is 0 and I did not recommend starting anticoagulation  We discussed empiric medical treatment.  He is inclined to try some medications and would like to hold off on ablation until this winter when he is less busy  And we had a comprehensive discussion about, full  electrophysiologic study and ablation.  I thought the ablation would require assessment for SVT mechanism as well as a  A. fib ablation, pulmonary vein isolation        Problem List:  Patient Active Problem List   Diagnosis     Palpitations     Acute chest pain     Paroxysmal atrial fibrillation (H)     Medical History:  No past medical history on file.  Surgical History:  No past surgical history on file.  Social  "History:  Social History     Social History     Marital status: Single     Spouse name: N/A     Number of children: N/A     Years of education: N/A     Occupational History     Not on file.     Social History Main Topics     Smoking status: Never Smoker     Smokeless tobacco: Never Used     Alcohol use Not on file     Drug use: Not on file     Sexual activity: Not on file     Other Topics Concern     Not on file     Social History Narrative       Review of Systems:      General: WNL  Eyes: WNL  Ears/Nose/Throat: WNL  Lungs: WNL  Heart: WNL  Stomach: WNL  Bladder: WNL  Muscle/Joints: WNL  Skin: WNL  Nervous System: WNL  Mental Health: WNL     Blood: WNL        Family History:  No family history on file.      Allergies:  Allergies   Allergen Reactions     Penicillins Unknown       Medications:  Current Outpatient Prescriptions   Medication Sig Dispense Refill     flecainide (TAMBOCOR) 100 MG tablet Take 1 tablet (100 mg total) by mouth as needed (Take flecainide 100 mg at onset of atrial fibrillation-can be repeated in 4 hours). 30 tablet 5     metoprolol succinate (TOPROL XL) 50 MG 24 hr tablet Take 1 tablet (50 mg total) by mouth daily. 90 tablet 5     No current facility-administered medications for this visit.        Objective:   Vital signs:  /90 (Patient Site: Left Arm, Patient Position: Sitting, Cuff Size: Adult Large)  Pulse 80  Resp 16  Ht 6' 3\" (1.905 m)  Wt (!) 267 lb 6.4 oz (121.3 kg)  BMI 33.42 kg/m2      Physical Exam:        GENERAL APPEARANCE: Alert, cooperative and in no acute distress.  HEENT: No scleral icterus. No Xanthelasma. Oral mucous membranes pink and moist.  NECK: JVP normal cm. No Hepatojugular reflux. Thyroid not  Palpable  CHEST: clear to auscultation and percussion  CARDIOVASCULAR: S1, S2 without murmur    Brachial, radial  pulses are intact and symmetric.   No carotid bruits noted.  ABDOMEN: Non tender. BS+. No bruits.  EXTREMITIES: No cyanosis, clubbing or edema.    Lab " Results:  LIPIDS:  No results found for: CHOL  No results found for: HDL  No results found for: LDLCALC  No results found for: TRIG  No components found for: CHOLHDL    BMP:  No results found for: CREATININE, BUN, NA, K, CL, CO2      This note has been dictated using voice recognition software. Any grammatical or context distortions are unintentional and inherent to the software.  Santhosh Black MD  [facility]  [phone]

## 2021-06-22 NOTE — PROGRESS NOTES
Post Procedural PVI Follow Up Visit    Pt is seen in clinic today status post PVI with Dr Knox on 12/10/18.     General Assessment:   Pts vital signs stable, weight compared to pre procedural weight and is stable, lung sounds clear, heart rate regular, heart sounds S1 and S2 present, palpable radial and pedal pulses and no edema/fluid retention noted.   Pt denies generalized or localized pain abnormal to healing s/p, difficulty swallowing, SOB, thoat pain, heart burn, chest pain, urinary retention/difficulty and s/s of infection.  Pt is tolerating advancement in activity well, staying well hydrated, has a good appetite, eating well balanced meals and gradually working into baseline activity.     Rhythm Assessment:   Pt denies palpitations, irregularities in HR or rhythm and symptoms or sustained AF episodes.    Procedure Site Assessment:   Pts right groin has 2 puncture sites, is C/D/I, no drainage, small amount of bruising noted around puncture sites, 1 suture in place, suture removed, groin is soft, and pt denies pain at the site. Left groin has 2 puncture sites, is C/D/I, no drainage, no amount of bruising noted around puncture site, 1 suture in place, sutures removed, groin is soft, and pt denies pain at the site. No bruits we heard bilaterally, and pt has strong bilateral femoral and pedal pulses present. All puncture sites were left open to air.    Anticoagulation/Medication:  Pt was instructed to remain on Eliquis without interruption until seen for 3mo follow-up, for further instructions/managment.  Reviewed with pt importance of anticoagulation s/p PVI, reviewed with pt s/s of bleeding while on anticoagulation s/p PVI and encouraged to call with any questions or concerns about anticoagulants  Pt will continue ASA 81mg Daily for 1 mo s/p PVI    Education completed with pt at this visit:  Reviewed post-op PVI healing process, follow up office visit requirements, physical restrictions, nutritional  requirements, when to contact EP-RN/EP-MD, contact information was given to the pt for further concerns or questions and pt verbalized understanding    12/13/2018 10:30 AM  Kylah Styles RN

## 2021-06-22 NOTE — ANESTHESIA CARE TRANSFER NOTE
Pt breathing spontaneously, follows commands, suctioned extubated. Spontaneous respirations with SFM on monitor to 4134. VSS.      Last vitals:   Vitals:    12/10/18 0607   BP: 133/84   Pulse: 71   Resp: 16   Temp: 37  C (98.6  F)   SpO2: 97%     Patient's level of consciousness is drowsy  Spontaneous respirations: yes  Maintains airway independently: yes  Dentition unchanged: yes  Oropharynx: oropharynx clear of all foreign objects    QCDR Measures:  ASA# 20 - Surgical Safety Checklist: WHO surgical safety checklist completed prior to induction    PQRS# 430 - Adult PONV Prevention: 4558F - Pt received => 2 anti-emetic agents (different classes) preop & intraop  ASA# 8 - Peds PONV Prevention: 4558F - Pt received => 2 anti-emetic agents (different classes) preop & intraop  PQRS# 424 - Es-op Temp Management: 4559F - At least one body temp DOCUMENTED => 35.5C or 95.9F within required timeframe  PQRS# 426 - PACU Transfer Protocol: - Transfer of care checklist used  ASA# 14 - Acute Post-op Pain: ASA14B - Patient did NOT experience pain >= 7 out of 10

## 2021-06-22 NOTE — ANESTHESIA POSTPROCEDURE EVALUATION
Patient: Mike Padilla  EP Ablation PVI - 5:45AM ADMIT, GEN GRADY WHOLE CASE, 4HRS W/ICE, CARTO BOOKED, H&P 12/4, EP Ablation AV Node Reentrant Tachycardia  Anesthesia type: general    Patient location: Telemetry/Step Down Unit  Last vitals:   Vitals:    12/10/18 1630   BP: 122/59   Pulse:    Resp:    Temp:    SpO2:      Post vital signs: stable  Level of consciousness: awake, alert and responds to simple questions  Post-anesthesia pain: pain controlled  Post-anesthesia nausea and vomiting: no  Pulmonary: unassisted, nasal cannula  Cardiovascular: stable and blood pressure at baseline  Hydration: adequate  Anesthetic events: no    QCDR Measures:  ASA# 11 - Es-op Cardiac Arrest: ASA11B - Patient did NOT experience unanticipated cardiac arrest  ASA# 12 - Es-op Mortality Rate: ASA12B - Patient did NOT die  ASA# 13 - PACU Re-Intubation Rate: ASA13B - Patient did NOT require a new airway mgmt  ASA# 10 - Composite Anes Safety: ASA10A - No serious adverse event    Additional Notes:

## 2021-06-22 NOTE — PROGRESS NOTES
PVI education was completed:     See documented H&P completed by NP in EPIC    Reviewed pre and post op PVI procedure with pt, pre-procedure letter reviewed and given to pt- see letter in EPIC under documentation, see additional EP PVI prep note for details on procedure/prep and answered pt questions and concerns regarding procedure    Discussed importance of continuing anticoagulation uninterrupted pre and post ablation, pt denies missing any doses of their anticoagulant, pt denies issues with muller placement in the past, instructions given to pt to start Pepcid 20mg BID 3 days prior to PVI, and to continue 3 wks s/p PVI and perscription was sent for pepcid 20mg BID     12/7/2018 11:04 AM  Kylah Styles RN

## 2021-06-22 NOTE — ANESTHESIA PREPROCEDURE EVALUATION
Anesthesia Evaluation      Patient summary reviewed   No history of anesthetic complications     Airway   Mallampati: I  Neck ROM: full   Pulmonary - normal exam    breath sounds clear to auscultation  (+) sleep apnea (Pt believes he may have undiagnosed MARILU by witness accounts) on no CPAP, ,                          Cardiovascular - normal exam  Exercise tolerance: > or = 4 METS  (+) dysrhythmias (p.a.fib, SVT), ,     (-) murmur  ECG reviewed  Rhythm: regular  Rate: normal,    no murmur      Neuro/Psych    (+) anxiety/panic attacks,     Comments: ADD    Endo/Other    (+) obesity (BMI 35),      GI/Hepatic/Renal - negative ROS      Other findings: 7/25/18 Echo  Summary     1. Normal left ventricular size and systolic performance with a visually estimated ejection fraction of 55-60%.   2. No significant valvular heart disease is identified on this study.   3. Normal right ventricular size and systolic performance.          Dental - normal exam                        Anesthesia Plan  Planned anesthetic: general endotracheal  Decadron 10 mg IV  Zofran      ASA 2   Induction: intravenous   Anesthetic plan and risks discussed with: patient  Anesthesia plan special considerations: antiemetics,   Post-op plan: routine recovery

## 2021-06-23 NOTE — PATIENT INSTRUCTIONS - HE
Mike Padilla,    It was a pleasure to see you today at the [facility].     My recommendations after this visit include:    Continue Eliquis 5 mg twice daily  Stop taking aspirin.    Wear 2 week monitor to evaluate for A fib, press button to record symptoms.    Follow up in 6-8 weeks.    Adela Bajwa, [facility], Electrophysiology  [phone]  Eunice (nurse) [phone]

## 2021-06-24 NOTE — TELEPHONE ENCOUNTER
Phone call to patient today after his no-show apt with Adela Bajwa.  Pt also no-showed for his 14 day ACT monitor, ordered post PVI with Dr. Knox on 12-10-19.  Pt states he forgot about both appoinments and wishes he could get text messages for upcoming appointments.    Pt states he does not think that he has had any other episodes of afib since January, continues to take Eliquis.  Explained that a 2 week monitor is needed to evaluate for silent afib and then a follow up would be scheduled to discuss the results and the possibility of discontinuing Eliquis.  Pt states understanding of this.  He notes that he has just quit his job and does not have insurance, he will follow up once he has insurance again.  Will verify with clinic manager about reminders for appointments, will send a reminder to check with the patient and his insurance status to nursing staff in 6 weeks.

## 2021-06-26 NOTE — PROGRESS NOTES
Progress Notes by David Walker MD (Ted) at 6/29/2018  3:30 PM     Author: David Walker MD (Ted) Service: -- Author Type: Physician    Filed: 6/29/2018  5:16 PM Encounter Date: 6/29/2018 Status: Signed    : David Walker MD (Ted) (Physician)           Click to link to [facility]     [facility] HEART CARE NOTE    Thank you, Dr. Mcgowan ref. provider found, for asking the [facility] to evaluate Mr. Mike Padilla.      Assessment/Recommendations   Assessment:    Heart palpitations, frequent and symptomatic, presumably related to either atrial fibrillation or some other form of SVT  Excessive weight    Plan:  Stress test   14 day event monitor  Echocardiogram    He will be a candidate for ablation if presence of atrial dysrhythmias is reconfirmed.     History of Present Illness    Mr. Mike Padilla is a 28 y.o. male who comes in for initial cardiac evaluation at [facility].  He has a history of heart palpitations.  He has had the symptoms for well over a year now.  He describes a variety of different symptoms including fast beating of the heart as well as forceful and irregular beating of the heart.  He has not had syncope with any of those episodes.  On one occasions he felt faint but did not lose consciousness.  [facility]  He had abnormal event monitor that showed atrial fibrillation and possibly atrial flutter or SVT.  He had normal echocardiogram.  He was seen by electrophysiologist who recommended abstinence from stimulants including alcohol.  He did discuss possibility of ablation.  The patient did not return for follow-up visit.  He did not have a good rapport with electrophysiologist.  The patient was advised by his friend to seek medical help at [facility].  The patient has no history of known coronary artery disease, valvular heart disease, cardiomyopathy.  He denies exertional chest  pains.    ECG: Personally reviewed.  Normal sinus rhythm within normal       Physical Examination Review of Systems   Vitals:    06/29/18 1530   BP: 126/82   Pulse: 90     Body mass index is 33.12 kg/(m^2).  Wt Readings from Last 3 Encounters:   06/29/18 (!) 265 lb (120.2 kg)     General Appearance:   Alert, cooperative, no distress, appears stated age   Head/ENT: Normocephalic, without obvious abnormality. Membranes moist      EYES:  no scleral icterus, normal conjunctivae   Neck: Supple, symmetrical, trachea midline, no adenopathy, thyroid: not enlarged, symmetric, no carotid bruit or JVD   Chest/Lungs:   Lungs are clear to auscultation, respirations unlabored. No tenderness or deformity    Cardiovascular:   Regular rhythm, S1, S2 normal, no murmur, rub or gallop.   Abdomen:  Soft, non-tender, bowel sounds active all four quadrants,  no masses, no organomegaly   Extremities: no cyanosis or clubbing. No edema   Skin: Skin color, texture, turgor normal, no rashes or lesions.    Psychiatric: Normal affect, calm   Neurologic: Alert and oriented x 3, moving all four extremities.     General: WNL  Eyes: WNL  Ears/Nose/Throat: WNL  Lungs: WNL  Heart: Chest Pain, Arm Pain, Shortness of Breath with activity, Irregular Heartbeat  Stomach: WNL  Bladder: WNL  Muscle/Joints: WNL  Skin: WNL  Nervous System: WNL  Mental Health: WNL     Blood: WNL     Medical History  Surgical History Family History Social History   Heart palpitations   Nasal surgery no family history of premature coronary artery disease or sudden death Social History     Social History   ? Marital status: Single     Spouse name: N/A   ? Number of children: N/A   ? Years of education: N/A     Occupational History   ?  Sales- [employer]     Social History Main Topics   ? Smoking status: Never Smoker   ? Smokeless tobacco: Not on file   ? Alcohol use Not on file   ? Drug use: Not on file   ? Sexual activity: Not on file     Other Topics Concern   ? Not on  file     Social History Narrative   ? No narrative on file          Medications  Allergies   No current outpatient prescriptions on file.     No current facility-administered medications for this visit.       No Known Allergies      Lab Results    Chemistry/lipid CBC Cardiac Enzymes/BNP/TSH/INR   No results found for: CHOL, HDL, LDLCALC, TRIG, CREATININE, BUN, K, NA, CL, CO2 No results found for: WBC, HGB, HCT, MCV, PLT No results found for: CKTOTAL, CKMB, CKMBINDEX, TROPONINI, BNP, TSH, INR

## 2021-06-26 NOTE — PROGRESS NOTES
Progress Notes by Akosua Britt RN at 10/3/2018  2:51 PM     Author: Akosua Britt RN Service: -- Author Type: Registered Nurse    Filed: 10/3/2018  2:51 PM Encounter Date: 10/3/2018 Status: Signed    : Akosua Britt RN (Registered Nurse)       Noted.  Chart prepped, orders placed and patient will be contacted to schedule procedure.          MD Akosua Paiz RN Caroline Okay to schedule for PVI   Schedule for 4 hours.   MRI yes   GERALD no   Eliquis initiation prior to procedure.   Thanks   Raghu

## 2021-06-26 NOTE — PROGRESS NOTES
Progress Notes by Adela Bajwa CNP at 9/25/2018  8:30 AM     Author: Adela Bajwa CNP Service: -- Author Type: Nurse Practitioner    Filed: 9/25/2018 10:06 AM Encounter Date: 9/25/2018 Status: Signed    : Adela Bajwa CNP (Nurse Practitioner)           Click to link to [facility] Heart [facility] HEART [facility] ELECTROPHYSIOLOGY NOTE      Assessment/Recommendations   Assessment/Plan:    Paroxysmal atrial fibrillation: Initially diagnosed in 2017.  Demented paroxysmal atrial fibrillation and regular tachycardia, likely SVT.  Symptoms consist of tachypalpitations and presyncope with faster, longer episodes.  We had a lengthy discussion of the physiology and natural progression of atrial fibrillation as well as SVT.  We also discussed the importance of avoiding possible triggers.  I recommended that he limit his caffeine and alcohol intake and increase his daily water intake.  At his young age, recommend ablation over long-term medication.  We discussed EP study, possible accessory pathway ablation, pulmonary vein isolation ablation, <1% risk for complication, expected recovery, and follow-up.  We also discussed the need for oral anticoagulation therapy before and after ablation.  He was given some information to review at home.  He is potentially interested in proceeding with ablation, preferably in December of this year.  We will contact him in approximately 1 week to see if he wishes to proceed.    In the interim, I reommended that he start taking metoprolol succinate 25 mg daily at night.  We reviewed instructions for using flecainide pill in the pocket; take 100 mg at onset of A. fib and an additional 50 mg in 4 hours if needed.    He was reassured that atrial fibrillation and SVT are not life-threatening and he has a very low risk for stroke associated with A. fib.  He has a [id number] score of 0.  Per current guidelines, long-term oral anticoagulation therapy is not recommended.    Follow-up  in 3 months if he chooses not to pursue ablation at this time.     History of Present Illness    Mr. Mike Padilla is a very pleasant 28 y.o. male who comes in today for EP follow-up of atrial fibrillation.  He has a 2 year history of episodic tachycardia, diagnosed with atrial fibrillation and SVT in 2017.  Recent event monitor again documented paroxysmal atrial fibrillation as well as a regular tachycardia possibly supraventricular tachycardia.  He initially notes that episodes were very infrequent, but have more recently become quite frequent.  Faster longer episodes are also associated with presyncope.  He notes that strenuous activity such as playing basketball, overeating, and alcohol are frequently triggers.  He has had to stop playing basketball.  In hindsight, he notes that his caffeine consumption has recently increased.  Dr. Black had recommended starting metoprolol and using flecainide pill in the pocket, but he has not yet picked up either prescription.  He denies chest discomfort, abdominal bloating/fullness or peripheral edema, shortness of breath, paroxysmal nocturnal dyspnea, orthopnea, recurrent lightheadedness, dizziness, or syncope.    Cardiographics (personally reviewed):  EKG, Done 6/29/2018 shows sinus rhythm at 64 bpm, QT/QTc interval measures 396/408 ms    One-lead Patch monitor worn 7/25/2018 through 8/7/2018 showed frequent episodes of paroxysmal atrial fibrillation with rapid ventricular response with occasional PVCs versus aberrancy associated with symptoms of palpitations and rapid heart beating.  Some episodes began as sinus tachycardia followed by atrial fibrillation.    ECHO, Done 7/25/2018:   1. Normal left ventricular size and systolic performance with a visually estimated ejection fraction of 55-60%.   2. No significant valvular heart disease is identified on this study.   3. Normal right ventricular size and systolic performance.     Stress test graded done 7/25/2018: No  electrocardiographic evidence of ischemia, no chest pain reported with exercise, average functional capacity for age.     Physical Examination Review of Systems   Vitals:    09/25/18 0832   BP: 110/78   Pulse: 86   Resp: 16     Body mass index is 33 kg/(m^2).  Wt Readings from Last 3 Encounters:   09/25/18 (!) 264 lb (119.7 kg)   08/30/18 (!) 267 lb 6.4 oz (121.3 kg)   07/25/18 (!) 265 lb (120.2 kg)       General Appearance:   Alert, well-appearing and in no acute distress.   HEENT: Atraumatic, normocephalic.  No scleral icterus, normal conjunctivae, EOM intact, PERRL; mucous membranes pink and moist.  No obvious thyromegaly.   Chest: Chest symmetric, spine straight.   Lungs:   Respirations unlabored: Lungs are clear to auscultation.   Cardiovascular:   Normal first and second heart sounds with no murmurs, rubs, or gallops.  Regular rate and rhythm.  Radial and posterior tibial pulses are intact, no edema.   Abdomen:  Soft, nontender, nondistended, bowel sounds present   Extremities: No cyanosis or clubbing   Musculoskeletal: Moves all extremities   Skin: Warm, dry, intact.    Neurologic: Mood and affect are appropriate, alert and oriented to person, place, time, and situation    General: WNL  Eyes: WNL  Ears/Nose/Throat: WNL  Lungs: WNL  Heart: WNL  Stomach: WNL  Bladder: WNL  Muscle/Joints: WNL  Skin: WNL  Nervous System: WNL  Mental Health: WNL     Blood: WNL     Medical History  Surgical History Family History Social History   Past Medical History:   Diagnosis Date   ? ADD (attention deficit disorder) without hyperactivity    ? Arachnoid cyst     Past Surgical History:   Procedure Laterality Date   ? TONSILLECTOMY AND ADENOIDECTOMY      Family History   Problem Relation Age of Onset   ? No Medical Problems Mother    ? No Medical Problems Father     Social History     Social History   ? Marital status: Single     Spouse name: N/A   ? Number of children: N/A   ? Years of education: N/A     Occupational History   ?  Not on file.     Social History Main Topics   ? Smoking status: Never Smoker   ? Smokeless tobacco: Never Used   ? Alcohol use Yes   ? Drug use: No   ? Sexual activity: Not on file     Other Topics Concern   ? Not on file     Social History Narrative          Medications  Allergies   Current Outpatient Prescriptions   Medication Sig Dispense Refill   ? flecainide (TAMBOCOR) 100 MG tablet Take 1 tablet (100 mg total) by mouth as needed (Take flecainide 100 mg at onset of atrial fibrillation-can be repeated in 4 hours). 30 tablet 5   ? metoprolol succinate (TOPROL XL) 50 MG 24 hr tablet Take 0.5 tablets (25 mg total) by mouth daily. 90 tablet 5     No current facility-administered medications for this visit.       Allergies   Allergen Reactions   ? Penicillins Unknown      Medical, surgical, family, social history, and medications were all reviewed and updated as necessary.   Lab Results    Chemistry CBC Other   No results found for: CREATININE, BUN, NA, K, CL, CO2  No results found for: CREATININE     BMP done 3/16/2017 was unremarkable, TSH 1.48 No results found for: WBC, HGB, HCT, MCV, PLT     CBC done 3/16/2017 was unremarkable No results found for: BNP  No results found for: BNP         40 minutes were spent face to face in this visit with more than 50% spent discussing diagnoses as listed above, counseling, and coordination of care.      Adela Bajwa, [facility]   Electrophysiology      This note has been dictated using voice recognition software. Any grammatical, typographical, or context distortions are unintentional and inherent to the software.

## 2021-06-27 NOTE — PROGRESS NOTES
Progress Notes by Adela Bajwa CNP at 1/21/2019 10:30 AM     Author: Adela Bajwa CNP Service: -- Author Type: Nurse Practitioner    Filed: 1/21/2019 11:29 AM Encounter Date: 1/21/2019 Status: Signed    : Adela Bajwa CNP (Nurse Practitioner)           Click to link to [facility] Heart Care     [facility] HEART [facility] ELECTROPHYSIOLOGY NOTE      Assessment/Recommendations   Assessment/Plan:    Paroxysmal atrial fibrillation: Prolonged episode of A. fib on 1/1/2019 likely triggered by excessive alcohol consumption the night before.  He remains off membrane active antiarrhythmic medication.  By symptomatic report it is not entirely clear if he is also had some shorter episodes.  He will wear a 2-week event monitor for further evaluation, but will wait until he returns from [place] where he will be for the next 2 weeks.  He was again instructed to decrease his alcohol intake and make sure he stays well hydrated.  He has had an uneventful recovery from ablation with no emergency department or unscheduled clinic visits.    He has a [id number] score of 0.  Continue Eliquis 5 mg twice daily for stroke prophylaxis for a minimum of 3 months following ablation.  He denies missed doses or bleeding issues.  Discontinue aspirin.       Follow-up 3 months post ablation.     History of Present Illness    Mr. Mike Padilla is a very pleasant 28 y.o. male who comes in today for EP follow-up of atrial fibrillation and history and physical prior to pulmonary vein isolation ablation.  He has a 2 year history of episodic tachycardia, diagnosed with atrial fibrillation and SVT in 2017.  Recent event monitor again documented paroxysmal atrial fibrillation as well as a regular tachycardia possibly supraventricular tachycardia.  Symptoms consist of tachypalpitations and presyncope with faster, longer episodes.  Episodes were increasing in frequency and duration.  He notes that strenuous activity such as playing basketball,  overeating, and alcohol are frequently triggers.  He tried metoprolol and flecainide pill in the pocket, but he states that it did not work.  He underwent pulmonary vein isolation ablation with Dr. Knox on 12/10/2018 with cryo-to all 4 pulmonary veins as well as ablation of AVNRT slow pathway.   He continues on Eliquis 5 mg twice daily for stroke prophylaxis.    He states that he is feeling well and has gone back to working out.  He had a 7-hour episode of A. fib on 1/1/2019 admits to drinking heavily the night before.  He also thinks he may have had a couple of short episodes.  He states that he has had an uneventful recovery from ablation and denies any difficulty swallowing, heartburn, or groin site issues. He denies chest discomfort, abdominal bloating/fullness or peripheral edema, shortness of breath, paroxysmal nocturnal dyspnea, orthopnea, recurrent lightheadedness, dizziness, or syncope.      Cardiographics (personally reviewed):  EKG, Done 12/7/2018 shows sinus rhythm at 77 bpm, QT/QTc interval measures 358/405 ms    One-lead Patch monitor worn 7/25/2018 through 8/7/2018 showed frequent episodes of paroxysmal atrial fibrillation with rapid ventricular response with occasional PVCs versus aberrancy associated with symptoms of palpitations and rapid heart beating.  Some episodes began as sinus tachycardia followed by atrial fibrillation.    ECHO, Done 7/25/2018:   1. Normal left ventricular size and systolic performance with a visually estimated ejection fraction of 55-60%.   2. No significant valvular heart disease is identified on this study.   3. Normal right ventricular size and systolic performance.     Stress test graded done 7/25/2018: No electrocardiographic evidence of ischemia, no chest pain reported with exercise, average functional capacity for age.    MR cardiac pulmonary vein done 12/5/2018:   1. There are five (three right sided) pulmonary veins.   2. Esophagus is adjacent to the right  posterior wall of left atrial body.  3. Left atrium is normal size.  4. Normal left ventricular size, wall thickness and systolic function. The quantified left ventricular ejection fraction is 61% . No myocardial scar is identified.   5. Right ventricle is borderline enlarged (RVEDVi:93 ml/m2) with normal right ventricular systolic function (64.5%). Mild enlargement of main pulmonary artery at 34 mm.  6. No significant valvular abnormalites  No significant incidental extracardiac findings.       Physical Examination Review of Systems   Vitals:    01/21/19 1053   BP: 110/78   Pulse: 64   Resp: 16     Body mass index is 34.5 kg/m .  Wt Readings from Last 3 Encounters:   01/21/19 (!) 276 lb (125.2 kg)   12/13/18 (!) 278 lb (126.1 kg)   12/10/18 (!) 275 lb 4.8 oz (124.9 kg)       General Appearance:   Alert, well-appearing and in no acute distress.   HEENT: Atraumatic, normocephalic.  No scleral icterus, normal conjunctivae, EOM intact, PERRL; mucous membranes pink and moist.     Chest: Chest symmetric, spine straight.   Lungs:   Respirations unlabored: Lungs are clear to auscultation.   Cardiovascular:   Normal first and second heart sounds with no murmurs, rubs, or gallops.  Regular rate and rhythm.  Radial and posterior tibial pulses are intact, no edema.   Abdomen:  Nondistended, bowel sounds present   Extremities: No cyanosis or clubbing   Musculoskeletal: Moves all extremities   Skin: Warm, dry, intact.    Neurologic: Mood and affect are appropriate, alert and oriented to person, place, time, and situation    General: WNL  Eyes: WNL  Ears/Nose/Throat: WNL  Lungs: WNL  Heart: Irregular Heartbeat  Stomach: WNL  Bladder: WNL  Muscle/Joints: WNL  Skin: WNL  Nervous System: WNL  Mental Health: WNL     Blood: WNL     Medical History  Surgical History Family History Social History   Past Medical History:   Diagnosis Date   ? ADD (attention deficit disorder) without hyperactivity    ? Arachnoid cyst    ? Paroxysmal atrial  fibrillation (H) 8/30/2018    Dx Jul 2018 [id number] score = 0 PVI Dec 2018   ? 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 Status post catheter ablation of atrial fibrillation 12/10/2018    PVI Dec 10, 2018 (cryo-PVI + AVNRT ablation)    Past Surgical History:   Procedure Laterality Date   ? EP ABLATION AVNRT  12/10/2018    Procedure: EP Ablation AV Node Reentrant Tachycardia;  Surgeon: Raghu Knox MD;  Location: [facility] Cath Lab;  Service: Cardiology   ? EP ABLATION PVI Left 12/10/2018    Procedure: EP Ablation PVI;  Surgeon: Raghu Knox MD;  Location: [facility] Cath Lab;  Service: Cardiology   ? TONSILLECTOMY AND ADENOIDECTOMY     ? WISDOM TOOTH EXTRACTION      Family History   Problem Relation Age of Onset   ? No Medical Problems Mother    ? No Medical Problems Father     Social History     Socioeconomic History   ? Marital status: Single     Spouse name: Not on file   ? Number of children: Not on file   ? Years of education: Not on file   ? Highest education level: Not on file   Social Needs   ? Financial resource strain: Not on file   ? Food insecurity - worry: Not on file   ? Food insecurity - inability: Not on file   ? Transportation needs - medical: Not on file   ? Transportation needs - non-medical: Not on file   Occupational History   ? Not on file   Tobacco Use   ? Smoking status: Never Smoker   ? Smokeless tobacco: Never Used   Substance and Sexual Activity   ? Alcohol use: Yes   ? Drug use: No   ? Sexual activity: Not on file   Other Topics Concern   ? Not on file   Social History Narrative   ? Not on file          Medications  Allergies   Current Outpatient Medications   Medication Sig Dispense Refill   ? apixaban (ELIQUIS) 5 mg Tab tablet Take 1 tablet (5 mg total) by mouth 2 (two) times a day. 60 tablet 5     No current facility-administered medications for this visit.        Allergies   Allergen Reactions   ? Penicillins Unknown      Medical, surgical, family, social history, and medications were all reviewed and updated as necessary.   Lab Results    Chemistry CBC Other   Lab Results   Component Value Date    CREATININE 0.90 12/07/2018    BUN 10 12/07/2018     12/07/2018    K 4.4 12/07/2018     12/07/2018    CO2 25 12/07/2018     Creatinine (mg/dL)   Date Value   12/07/2018 0.90         Lab Results   Component Value Date    WBC 5.6 12/07/2018    HGB 16.2 12/07/2018    HCT 47.3 12/07/2018    MCV 90 12/07/2018     12/07/2018                Adela Bajwa, [facility]   Electrophysiology      This note has been dictated using voice recognition software. Any grammatical, typographical, or context distortions are unintentional and inherent to the software.

## 2021-06-27 NOTE — PROGRESS NOTES
Progress Notes by Adela Bajwa CNP at 12/7/2018  9:50 AM     Author: Adela Bajwa CNP Service: -- Author Type: Nurse Practitioner    Filed: 12/7/2018 11:49 AM Encounter Date: 12/7/2018 Status: Signed    : Adela Bajwa CNP (Nurse Practitioner)           Click to link to [facility] Heart [facility] HEART [facility] ELECTROPHYSIOLOGY NOTE      Assessment/Recommendations   Assessment/Plan:    Paroxysmal atrial fibrillation: Initially diagnosed in 2017.  Demented paroxysmal atrial fibrillation and regular tachycardia, likely SVT.  Symptoms consist of tachypalpitations and presyncope with faster, longer episodes.  We had a lengthy discussion of the physiology and natural progression of atrial fibrillation as well as SVT.  He is scheduled for pulmonary vein isolation ablation with Dr. Knox on 12/10/2018.  We discussed EP study, possible accessory pathway ablation, pulmonary vein isolation ablation, <1% risk for complication, expected recovery, and follow-up.  He has a [id number] score of 0 and HAS-BLED score of 0.  Started taking Eliquis 5 mg twice daily for stroke prophylaxis on 11/12/2018 in preparation for ablation.  He denies missing any doses.  We discussed that this will continue for a minimum of 3 months following ablation.  Start famotidine 20 mg twice daily, to continue for 3 weeks following ablation.    Post ablation check on 12/13/2018.  Follow-up with me 6 weeks post PVI.     History of Present Illness    Mr. Mike Padilla is a very pleasant 28 y.o. male who comes in today for EP follow-up of atrial fibrillation and history and physical prior to pulmonary vein isolation ablation.  He has a 2 year history of episodic tachycardia, diagnosed with atrial fibrillation and SVT in 2017.  Recent event monitor again documented paroxysmal atrial fibrillation as well as a regular tachycardia possibly supraventricular tachycardia.  He initially notes that episodes were very infrequent, but have more  recently become quite frequent, occurring several times per week and lasting up to a couple of hours.  Faster longer episodes are also associated with presyncope.  He notes that strenuous activity such as playing basketball, overeating, and alcohol are frequently triggers.  He has had to stop playing basketball.   He tried metoprolol and flecainide pill in the pocket, but he states that it did not work so he is not taking them.  He denies chest discomfort, abdominal bloating/fullness or peripheral edema, shortness of breath, paroxysmal nocturnal dyspnea, orthopnea, recurrent lightheadedness, dizziness, or syncope.  He started taking Eliquis 5 mg twice daily on 11/12/2018 in preparation for ablation.    Cardiographics (personally reviewed):  EKG, Done 12/7/2018 shows sinus rhythm at 77 bpm, QT/QTc interval measures 358/405 ms    One-lead Patch monitor worn 7/25/2018 through 8/7/2018 showed frequent episodes of paroxysmal atrial fibrillation with rapid ventricular response with occasional PVCs versus aberrancy associated with symptoms of palpitations and rapid heart beating.  Some episodes began as sinus tachycardia followed by atrial fibrillation.    ECHO, Done 7/25/2018:   1. Normal left ventricular size and systolic performance with a visually estimated ejection fraction of 55-60%.   2. No significant valvular heart disease is identified on this study.   3. Normal right ventricular size and systolic performance.     Stress test graded done 7/25/2018: No electrocardiographic evidence of ischemia, no chest pain reported with exercise, average functional capacity for age.    MR cardiac pulmonary vein done 12/5/2018: Results pending.  No significant incidental extracardiac findings.       Physical Examination Review of Systems   Vitals:    12/07/18 1004   BP: 116/80   Pulse: 72   Resp: 16     Body mass index is 34.62 kg/m .  Wt Readings from Last 3 Encounters:   12/07/18 (!) 277 lb (125.6 kg)   09/25/18 (!) 264 lb  (119.7 kg)   08/30/18 (!) 267 lb 6.4 oz (121.3 kg)       General Appearance:   Alert, well-appearing and in no acute distress.   HEENT: Atraumatic, normocephalic.  No scleral icterus, normal conjunctivae, EOM intact, PERRL; mucous membranes pink and moist.  No obvious thyromegaly.   Chest: Chest symmetric, spine straight.   Lungs:   Respirations unlabored: Lungs are clear to auscultation.   Cardiovascular:   Normal first and second heart sounds with no murmurs, rubs, or gallops.  Regular rate and rhythm.  Radial and posterior tibial pulses are intact, no edema.   Abdomen:  Soft, nontender, nondistended, bowel sounds present   Extremities: No cyanosis or clubbing   Musculoskeletal: Moves all extremities   Skin: Warm, dry, intact.    Neurologic: Mood and affect are appropriate, alert and oriented to person, place, time, and situation    General: Weight Gain  Eyes: WNL  Ears/Nose/Throat: WNL  Lungs: WNL  Heart: Chest Pain, Arm Pain, Shortness of Breath with activity, Irregular Heartbeat  Stomach: WNL  Bladder: WNL  Muscle/Joints: WNL  Skin: WNL  Nervous System: WNL  Mental Health: WNL     Blood: WNL     Medical History  Surgical History Family History Social History   Past Medical History:   Diagnosis Date   ? ADD (attention deficit disorder) without hyperactivity    ? Arachnoid cyst     Past Surgical History:   Procedure Laterality Date   ? TONSILLECTOMY AND ADENOIDECTOMY      Family History   Problem Relation Age of Onset   ? No Medical Problems Mother    ? No Medical Problems Father     Social History     Socioeconomic History   ? Marital status: Single     Spouse name: Not on file   ? Number of children: Not on file   ? Years of education: Not on file   ? Highest education level: Not on file   Social Needs   ? Financial resource strain: Not on file   ? Food insecurity - worry: Not on file   ? Food insecurity - inability: Not on file   ? Transportation needs - medical: Not on file   ? Transportation needs -  non-medical: Not on file   Occupational History   ? Not on file   Tobacco Use   ? Smoking status: Never Smoker   ? Smokeless tobacco: Never Used   Substance and Sexual Activity   ? Alcohol use: Yes   ? Drug use: No   ? Sexual activity: Not on file   Other Topics Concern   ? Not on file   Social History Narrative   ? Not on file          Medications  Allergies   Current Outpatient Medications   Medication Sig Dispense Refill   ? apixaban (ELIQUIS) 5 mg Tab tablet Take 1 tablet (5 mg total) by mouth 2 (two) times a day. 60 tablet 5   ? famotidine (PEPCID) 20 MG tablet Take 1 tablet (20 mg total) by mouth 2 (two) times a day. 48 tablet 0     No current facility-administered medications for this visit.       Allergies   Allergen Reactions   ? Penicillins Unknown      Medical, surgical, family, social history, and medications were all reviewed and updated as necessary.   Lab Results    Chemistry CBC Other   Lab Results   Component Value Date    CREATININE 0.90 12/07/2018    BUN 10 12/07/2018     12/07/2018    K 4.4 12/07/2018     12/07/2018    CO2 25 12/07/2018     Creatinine (mg/dL)   Date Value   12/07/2018 0.90         Lab Results   Component Value Date    WBC 5.6 12/07/2018    HGB 16.2 12/07/2018    HCT 47.3 12/07/2018    MCV 90 12/07/2018     12/07/2018       BMP done 3/16/2017 was unremarkable, TSH 1.48       45 minutes were spent face to face in this visit with more than 50% spent discussing diagnoses as listed above, counseling, and coordination of care.      Adela Bajwa, [facility]   Electrophysiology      This note has been dictated using voice recognition software. Any grammatical, typographical, or context distortions are unintentional and inherent to the software.

## 2021-06-27 NOTE — PROGRESS NOTES
Progress Notes by Adela Bajwa CNP at 7/9/2019  9:50 AM     Author: Adela Bajwa CNP Service: -- Author Type: Nurse Practitioner    Filed: 7/9/2019 10:39 AM Encounter Date: 7/9/2019 Status: Signed    : Adela Bajwa CNP (Nurse Practitioner)           Click to link to [facility]     [facility] ELECTROPHYSIOLOGY NOTE      Assessment/Recommendations   Assessment/Plan:    Paroxysmal atrial fibrillation: Brief episode of tachyarrhythmia on 4/12/2019 triggered by excessive alcohol consumption the night before.  He remains off membrane active antiarrhythmic medication.  He was again instructed to decrease his alcohol intake and make sure he stays well hydrated.     He has a [id number] score of 0.  He remains on Eliquis as he did not show up to his previous follow-up visits.  Discontinue Eliquis.  Start aspirin 81 mg daily..       Follow-up with Dr. Knox 1 year post ablation.     History of Present Illness    Mr. Mike Padilla is a very pleasant 29 y.o. male who comes in today for EP follow-up of atrial arrhythmias.  He has a 2 year history of episodic tachycardia, diagnosed with atrial fibrillation and SVT in 2017.  Recent event monitor again documented paroxysmal atrial fibrillation as well as a regular tachycardia possibly supraventricular tachycardia.  Symptoms consist of tachypalpitations and presyncope with faster, longer episodes.  Episodes were increasing in frequency and duration.  He notes that strenuous activity such as playing basketball, overeating, and alcohol are frequently triggers.  He tried metoprolol and flecainide pill in the pocket, but he states that it did not work.  He underwent pulmonary vein isolation ablation with Dr. Knox on 12/10/2018 with cryo-to all 4 pulmonary veins as well as ablation of AVNRT slow pathway.   He continues on Eliquis 5 mg twice daily for stroke prophylaxis.    He states that he is feeling well and has started playing basketball again.   He had a 1-hour episode of A. fib on 4/12/2019, but admits to drinking heavily the night before.  He describes the arrhythmia as fast and skipping associated with a sensation in his throat and shortness of breath.  He denies chest discomfort,  abdominal bloating/fullness or peripheral edema, shortness of breath, paroxysmal nocturnal dyspnea, orthopnea, recurrent lightheadedness, dizziness, or syncope.      Cardiographics (personally reviewed):  EKG, Done 12/7/2018 shows sinus rhythm at 77 bpm, QT/QTc interval measures 358/405 ms    Event monitor worn 5/28/2019 through 6/10/2019 showed 3 episodes of sinus tachycardia with heart rates of 110 bpm, 110 bpm, 155 bpm associated with symptoms.  No atrial fibrillation.  No significant bradycardia or pauses.    One-lead Patch monitor worn 7/25/2018 through 8/7/2018 showed frequent episodes of paroxysmal atrial fibrillation with rapid ventricular response with occasional PVCs versus aberrancy associated with symptoms of palpitations and rapid heart beating.  Some episodes began as sinus tachycardia followed by atrial fibrillation.    ECHO, Done 7/25/2018:   1. Normal left ventricular size and systolic performance with a visually estimated ejection fraction of 55-60%.   2. No significant valvular heart disease is identified on this study.   3. Normal right ventricular size and systolic performance.     Stress test graded done 7/25/2018: No electrocardiographic evidence of ischemia, no chest pain reported with exercise, average functional capacity for age.    MR cardiac pulmonary vein done 12/5/2018:   1. There are five (three right sided) pulmonary veins.   2. Esophagus is adjacent to the right posterior wall of left atrial body.  3. Left atrium is normal size.  4. Normal left ventricular size, wall thickness and systolic function. The quantified left ventricular ejection fraction is 61% . No myocardial scar is identified.   5. Right ventricle is borderline enlarged  (RVEDVi:93 ml/m2) with normal right ventricular systolic function (64.5%). Mild enlargement of main pulmonary artery at 34 mm.  6. No significant valvular abnormalites  No significant incidental extracardiac findings.       Physical Examination Review of Systems   Vitals:    07/09/19 0947   BP: 122/78   Pulse: 80   Resp: 16     Body mass index is 32.75 kg/m .  Wt Readings from Last 3 Encounters:   07/09/19 (!) 262 lb (118.8 kg)   01/21/19 (!) 276 lb (125.2 kg)   12/13/18 (!) 278 lb (126.1 kg)       General Appearance:   Alert, well-appearing and in no acute distress.   HEENT: Atraumatic, normocephalic.  No scleral icterus, normal conjunctivae, EOM intact, PERRL; mucous membranes pink and moist.     Chest: Chest symmetric, spine straight.   Lungs:   Respirations unlabored: Lungs are clear to auscultation.   Cardiovascular:   Normal first and second heart sounds with no murmurs, rubs, or gallops.  Regular rate and rhythm.  Radial and posterior tibial pulses are intact, no edema.   Abdomen:   Soft, nondistended, bowel sounds present   Extremities: No cyanosis or clubbing   Musculoskeletal: Moves all extremities   Skin: Warm, dry, intact.    Neurologic: Mood and affect are appropriate, alert and oriented to person, place, time, and situation    General: WNL  Eyes: WNL  Ears/Nose/Throat: WNL  Lungs: WNL  Heart: Irregular Heartbeat  Stomach: WNL  Bladder: WNL  Muscle/Joints: WNL  Skin: WNL  Nervous System: WNL  Mental Health: Depression     Blood: WNL     Medical History  Surgical History Family History Social History   Past Medical History:   Diagnosis Date   ? ADD (attention deficit disorder) without hyperactivity    ? Arachnoid cyst    ? Paroxysmal atrial fibrillation (H) 8/30/2018    Dx Jul 2018 [id number] score = 0 PVI Dec 2018   ? 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 Status post catheter ablation  of atrial fibrillation 12/10/2018    PVI Dec 10, 2018 (cryo-PVI + AVNRT ablation)    Past Surgical History:   Procedure Laterality Date   ? EP ABLATION AVNRT  12/10/2018    Procedure: EP Ablation AV Node Reentrant Tachycardia;  Surgeon: Raghu Knox MD;  Location: [facility] Cath Lab;  Service: Cardiology   ? EP ABLATION PVI Left 12/10/2018    Procedure: EP Ablation PVI;  Surgeon: Raghu Knox MD;  Location: [facility] Lab;  Service: Cardiology   ? TONSILLECTOMY AND ADENOIDECTOMY     ? WISDOM TOOTH EXTRACTION      Family History   Problem Relation Age of Onset   ? No Medical Problems Mother    ? No Medical Problems Father     Social History     Socioeconomic History   ? Marital status: Single     Spouse name: Not on file   ? Number of children: Not on file   ? Years of education: Not on file   ? Highest education level: Not on file   Occupational History   ? Not on file   Social Needs   ? Financial resource strain: Not on file   ? Food insecurity:     Worry: Not on file     Inability: Not on file   ? Transportation needs:     Medical: Not on file     Non-medical: Not on file   Tobacco Use   ? Smoking status: Never Smoker   ? Smokeless tobacco: Never Used   Substance and Sexual Activity   ? Alcohol use: Yes   ? Drug use: No   ? Sexual activity: Not on file   Lifestyle   ? Physical activity:     Days per week: Not on file     Minutes per session: Not on file   ? Stress: Not on file   Relationships   ? Social connections:     Talks on phone: Not on file     Gets together: Not on file     Attends Rastafarian service: Not on file     Active member of club or organization: Not on file     Attends meetings of clubs or organizations: Not on file     Relationship status: Not on file   ? Intimate partner violence:     Fear of current or ex partner: Not on file     Emotionally abused: Not on file     Physically abused: Not on file     Forced sexual activity: Not on file   Other Topics Concern   ? Not on file    Social History Narrative   ? Not on file          Medications  Allergies   Current Outpatient Medications   Medication Sig Dispense Refill   ? aspirin 81 MG EC tablet Take 1 tablet (81 mg total) by mouth daily.  0     No current facility-administered medications for this visit.       Allergies   Allergen Reactions   ? Penicillins Unknown      Medical, surgical, family, social history, and medications were all reviewed and updated as necessary.   Lab Results    Chemistry CBC Other   Lab Results   Component Value Date    CREATININE 0.90 12/07/2018    BUN 10 12/07/2018     12/07/2018    K 4.4 12/07/2018     12/07/2018    CO2 25 12/07/2018     Creatinine (mg/dL)   Date Value   12/07/2018 0.90         Lab Results   Component Value Date    WBC 5.6 12/07/2018    HGB 16.2 12/07/2018    HCT 47.3 12/07/2018    MCV 90 12/07/2018     12/07/2018                Adela Bajwa, [facility]   Electrophysiology      This note has been dictated using voice recognition software. Any grammatical, typographical, or context distortions are unintentional and inherent to the software.

## 2021-07-03 NOTE — ADDENDUM NOTE
Addendum Note by Kylah May RN at 11/7/2018  2:41 PM     Author: Kylah May RN Service: -- Author Type: Registered Nurse    Filed: 11/7/2018  2:41 PM Encounter Date: 10/29/2018 Status: Signed    : Kylah May RN (Registered Nurse)    Addended by: KYLAH MAY on: 11/7/2018 02:41 PM        Modules accepted: Orders